# Patient Record
Sex: FEMALE | Race: WHITE | NOT HISPANIC OR LATINO | URBAN - NONMETROPOLITAN AREA
[De-identification: names, ages, dates, MRNs, and addresses within clinical notes are randomized per-mention and may not be internally consistent; named-entity substitution may affect disease eponyms.]

---

## 2023-08-07 ENCOUNTER — OFFICE VISIT (OUTPATIENT)
Dept: CARDIOLOGY | Facility: CLINIC | Age: 48
End: 2023-08-07
Payer: COMMERCIAL

## 2023-08-07 VITALS
DIASTOLIC BLOOD PRESSURE: 62 MMHG | WEIGHT: 245 LBS | OXYGEN SATURATION: 98 % | BODY MASS INDEX: 46.26 KG/M2 | HEIGHT: 61 IN | HEART RATE: 78 BPM | SYSTOLIC BLOOD PRESSURE: 120 MMHG

## 2023-08-07 DIAGNOSIS — G47.33 OSA (OBSTRUCTIVE SLEEP APNEA): Primary | ICD-10-CM

## 2023-08-07 PROCEDURE — 99213 OFFICE O/P EST LOW 20 MIN: CPT | Performed by: NURSE PRACTITIONER

## 2023-08-07 RX ORDER — ESCITALOPRAM OXALATE 10 MG/1
10 TABLET ORAL DAILY
COMMUNITY
Start: 2023-07-27

## 2023-08-07 RX ORDER — LEVOTHYROXINE SODIUM 0.1 MG/1
100 TABLET ORAL DAILY
COMMUNITY

## 2023-08-07 RX ORDER — FLUTICASONE PROPIONATE 50 MCG
1 SPRAY, SUSPENSION (ML) NASAL AS NEEDED
COMMUNITY
Start: 2023-07-31

## 2023-08-07 RX ORDER — MULTIPLE VITAMINS W/ MINERALS TAB 9MG-400MCG
1 TAB ORAL DAILY
COMMUNITY

## 2023-08-07 NOTE — ASSESSMENT & PLAN NOTE
Baseline AHI is mild.  She is on CPAP therapy.  Download is reviewed good control and good compliance.  She is benefiting from PAP therapy and plan to continue PAP therapy.    Prescription for CPAP supplies to the DME of her choice.    She is encouraged to increase her humidifier to prevent dryness.    Plan follow-up in 1 year or sooner for OMAR or PAP concerns.

## 2023-08-07 NOTE — PROGRESS NOTES
Follow-Up Sleep Consult     Date:   2023  Name: Vane Rey  :   1975  PCP: Orly Hung DO    Chief Complaint   Patient presents with    Sleep Apnea       Subjective     History of Present Illness  Vane Rey is a 47 y.o. female who presents today for follow-up on OMAR. She reports that she is under stress and she has gained weigh this year. She uses a mouth guard when she sleeps but she still snores with the mouthguard in not wearing PAP therapy.  She reports that when she wears her CPAP therapy her  reports she does not store.    She reports that she does not love her PAP therapy.  And she reports at times she just does not use PAP therapy.  She reports sometimes when she does not use her PAP therapy that she does feel more tired and her  reports she snores.    She reports she does not like the hot air and she is currently feeling dry.    Sleep history:    OMAR AHI 6 on 2016    Current OMAR therapy auto CPAP 12 to 15 cm      Current mask used is Nasal Mask cushion     Device Functioning Well: Yes - she has been on Replacement DS #2 since   Mask Fit Comfortable: Yes  Air Flow Comfortable: Yes  DME Helpful for Supplies: Yes  Sleep is rested: Yes        Device Download:           The patient's relevant past medical, surgical, family, and social history reviewed and updated in Epic as appropriate.    Past Medical History:   Diagnosis Date    Allergic     Anxiety     History of ear infections     OMAR (obstructive sleep apnea)     Sinusitis     Strep throat      Past Surgical History:   Procedure Laterality Date     SECTION      x 2    WISDOM TOOTH EXTRACTION       OB History    No obstetric history on file.       No Known Allergies  Prior to Admission medications    Medication Sig Start Date End Date Taking? Authorizing Provider   cetirizine (ZyrTEC) 10 MG tablet Take 1 tablet by mouth Daily.   Yes Provider, MD Dunia   escitalopram (LEXAPRO) 10 MG tablet  "Take 1 tablet by mouth Daily. 7/27/23  Yes Dunia Adames MD   fluticasone (FLONASE) 50 MCG/ACT nasal spray 1 spray into the nostril(s) as directed by provider As Needed. 7/31/23  Yes Dunia Adames MD   levothyroxine (SYNTHROID, LEVOTHROID) 100 MCG tablet Take 1 tablet by mouth Daily.   Yes Dunia Adames MD   multivitamin with minerals tablet tablet Take 1 tablet by mouth Daily.   Yes Dunia Adames MD   levothyroxine (SYNTHROID, LEVOTHROID) 112 MCG tablet Take 112 mcg by mouth Daily.  8/7/23  Dunia Adames MD   sertraline (ZOLOFT) 100 MG tablet Take 100 mg by mouth Daily.  8/7/23  Dunia Adames MD     Family History   Problem Relation Age of Onset    Diabetes Mother     Obesity Mother     Diabetes Father     Obesity Father        Objective     Vital Signs:  /62 (BP Location: Right arm, Patient Position: Sitting)   Pulse 78   Ht 154.9 cm (61\")   Wt 111 kg (245 lb)   SpO2 98%   BMI 46.29 kg/mý     Class 3 Severe Obesity (BMI >=40). Obesity-related health conditions include the following: obstructive sleep apnea. Obesity is worsening. BMI is is above average; BMI management plan is completed. We discussed low calorie, low carb based diet program, portion control, and increasing exercise.  She reports when she returns to work as a  that she will increase her daily steps to about 7000 steps per day.        Physical Exam  HENT:      Head: Normocephalic.      Nose: Nose normal.      Mouth/Throat:      Mouth: Mucous membranes are moist.   Pulmonary:      Effort: Pulmonary effort is normal.   Skin:     General: Skin is warm and dry.   Neurological:      Mental Status: She is alert and oriented to person, place, and time.   Psychiatric:         Mood and Affect: Mood normal.         Behavior: Behavior normal.         Thought Content: Thought content normal.           PAP download reviewed: 7/8 through 8/6/2023.  30-day download.  I have reviewed and " interpreted the data on the download.         Assessment and Plan     Diagnoses and all orders for this visit:    1. OMAR (obstructive sleep apnea) (Primary)  Assessment & Plan:  Baseline AHI is mild.  She is on CPAP therapy.  Download is reviewed good control and good compliance.  She is benefiting from PAP therapy and plan to continue PAP therapy.    Prescription for CPAP supplies to the DME of her choice.    She is encouraged to increase her humidifier to prevent dryness.    Plan follow-up in 1 year or sooner for OMAR or PAP concerns.    Orders:  -     PAP Therapy        Report if any new/changing symptoms immediately, Exercise recommendations discussed, Increase pap therapy usage, and dietary recommendations discussed such as decrease sugar intake, decrease carbohydrate intake, decrease calorie intake.         Follow Up  Return in about 1 year (around 8/7/2024) for OMAR.  Patient was given instructions and counseling regarding her condition or for health maintenance advice. Please see specific information pulled into the AVS if appropriate.

## 2024-01-02 ENCOUNTER — OFFICE VISIT (OUTPATIENT)
Dept: ENDOCRINOLOGY | Facility: CLINIC | Age: 49
End: 2024-01-02
Payer: COMMERCIAL

## 2024-01-02 VITALS
DIASTOLIC BLOOD PRESSURE: 62 MMHG | SYSTOLIC BLOOD PRESSURE: 124 MMHG | BODY MASS INDEX: 46.07 KG/M2 | WEIGHT: 244 LBS | OXYGEN SATURATION: 97 % | HEART RATE: 71 BPM | HEIGHT: 61 IN

## 2024-01-02 DIAGNOSIS — E06.3 HYPOTHYROIDISM DUE TO HASHIMOTO'S THYROIDITIS: Primary | ICD-10-CM

## 2024-01-02 DIAGNOSIS — E66.01 CLASS 3 SEVERE OBESITY DUE TO EXCESS CALORIES WITH SERIOUS COMORBIDITY AND BODY MASS INDEX (BMI) OF 45.0 TO 49.9 IN ADULT: ICD-10-CM

## 2024-01-02 DIAGNOSIS — R73.03 PREDIABETES: ICD-10-CM

## 2024-01-02 DIAGNOSIS — E03.8 HYPOTHYROIDISM DUE TO HASHIMOTO'S THYROIDITIS: Primary | ICD-10-CM

## 2024-01-02 LAB
ANION GAP SERPL CALCULATED.3IONS-SCNC: 11.5 MMOL/L (ref 5–15)
BUN SERPL-MCNC: 13 MG/DL (ref 6–20)
BUN/CREAT SERPL: 18.1 (ref 7–25)
CALCIUM SPEC-SCNC: 9.2 MG/DL (ref 8.6–10.5)
CHLORIDE SERPL-SCNC: 103 MMOL/L (ref 98–107)
CO2 SERPL-SCNC: 25.5 MMOL/L (ref 22–29)
CREAT SERPL-MCNC: 0.72 MG/DL (ref 0.57–1)
EGFRCR SERPLBLD CKD-EPI 2021: 103.9 ML/MIN/1.73
GLUCOSE SERPL-MCNC: 123 MG/DL (ref 65–99)
HBA1C MFR BLD: 6.4 % (ref 4.8–5.6)
POTASSIUM SERPL-SCNC: 4.4 MMOL/L (ref 3.5–5.2)
SODIUM SERPL-SCNC: 140 MMOL/L (ref 136–145)
T4 FREE SERPL-MCNC: 1.19 NG/DL (ref 0.93–1.7)
TSH SERPL DL<=0.05 MIU/L-ACNC: 2.49 UIU/ML (ref 0.27–4.2)

## 2024-01-02 PROCEDURE — 84443 ASSAY THYROID STIM HORMONE: CPT | Performed by: INTERNAL MEDICINE

## 2024-01-02 PROCEDURE — 80048 BASIC METABOLIC PNL TOTAL CA: CPT | Performed by: INTERNAL MEDICINE

## 2024-01-02 PROCEDURE — 84439 ASSAY OF FREE THYROXINE: CPT | Performed by: INTERNAL MEDICINE

## 2024-01-02 PROCEDURE — 99204 OFFICE O/P NEW MOD 45 MIN: CPT | Performed by: INTERNAL MEDICINE

## 2024-01-02 PROCEDURE — 83036 HEMOGLOBIN GLYCOSYLATED A1C: CPT | Performed by: INTERNAL MEDICINE

## 2024-01-02 NOTE — PROGRESS NOTES
Chief Complaint   Patient presents with    Thyroid Problem        Referring Provider  Orly Hung, *     HPI   Vane Rey is a 48 y.o. female had concerns including Thyroid Problem.     New patient referred for hypothyroidism.  She is on levothyroxine 100 mcg daily. Takes as directed, in AM, 1 hr before meds/food/coffee, no missed doses. TFTs from August were normal.  TPO was checked and was elevated.   Hypothyroidism was diagnosed about 20 years ago.   In the past was on 112 mcg daily. Since the dose reduction she has struggled with weight gain.   Pt recalls the dose was reduced in January when her TSH was 3.   Had lost 25 lbs and with dose reduction she regained all.     She gets 8000 steps per day. No other exercise.   Has OMAR. Compliant with CPAP.     She also has prediabetes with last A1c 6.0.    Diet:   Drinks water.   Avoids fried foods.   Manages her carb intake.   Cheats on sweets some (spoonful of nutella on occasion).  Breakfast: 1/4 oatmeal with spoonful of PB or half a sandwich (PB), coffee with 1/2 tsp of sweetener and 1 tsp sugar free vanilla sweetener  Lunch: turkey sandwich, leftovers. If has chips is portioned.   Dinner: chicken, fatjita style, peppers/onions, turkey burger, soups  Snacks: popcorn (white cheddar smart food), crackers    Past Medical History:   Diagnosis Date    Allergic     Anxiety     History of ear infections     Hypothyroidism     OMAR (obstructive sleep apnea)     Prediabetes     Sinusitis     Strep throat      Past Surgical History:   Procedure Laterality Date     SECTION      x 2    WISDOM TOOTH EXTRACTION        Family History   Problem Relation Age of Onset    Diabetes Mother     Obesity Mother     Diabetes Father     Obesity Father       Social History     Socioeconomic History    Marital status:    Tobacco Use    Smoking status: Never     Passive exposure: Never    Smokeless tobacco: Never   Vaping Use    Vaping Use: Never used   Substance and  "Sexual Activity    Alcohol use: Defer    Drug use: Defer    Sexual activity: Yes     Partners: Male     Birth control/protection: OCP      No Known Allergies   Current Outpatient Medications on File Prior to Visit   Medication Sig Dispense Refill    cetirizine (ZyrTEC) 10 MG tablet Take 1 tablet by mouth Daily.      escitalopram (LEXAPRO) 10 MG tablet Take 1 tablet by mouth Daily.      fluticasone (FLONASE) 50 MCG/ACT nasal spray 1 spray into the nostril(s) as directed by provider As Needed.      levothyroxine (SYNTHROID, LEVOTHROID) 100 MCG tablet Take 1 tablet by mouth Daily.      multivitamin with minerals tablet tablet Take 1 tablet by mouth Daily.       No current facility-administered medications on file prior to visit.        Review of Systems   Constitutional: Negative.  Positive for unexpected weight gain.   HENT: Negative.     Eyes: Negative.    Respiratory: Negative.     Cardiovascular: Negative.    Gastrointestinal: Negative.    Endocrine: Negative.    Genitourinary: Negative.    Musculoskeletal: Negative.    Allergic/Immunologic: Positive for environmental allergies.   Neurological: Negative.    Hematological: Negative.    Psychiatric/Behavioral: Negative.  The patient is nervous/anxious.         /62 (BP Location: Right arm, Patient Position: Sitting, Cuff Size: Adult)   Pulse 71   Ht 154.9 cm (61\")   Wt 111 kg (244 lb)   SpO2 97%   BMI 46.10 kg/m²      Physical Exam    Constitutional:  well developed; well nourished  no acute distress  obese - Body mass index is 46.1 kg/m².   ENT/Thyroid: no thyromegaly  no palpable nodules   Eyes: EOM intact  Conjunctiva: clear   Respiratory:  breathing is unlabored  clear to auscultation bilaterally   Cardiovascular:  regular rate and rhythm, S1, S2 normal, no murmur, click, rub or gallop   Chest:  Not performed.   Abdomen: Not performed.   : Not performed.   Musculoskeletal: negative findings:  ROM of all joints is normal, no deformities present   Skin: " dry and warm   Neuro: normal without focal findings and mental status, speech normal, alert and oriented x3   Psych: oriented to time, place and person, mood and affect are within normal limits     Labs/Imaging    8/11/2023 glucose 100, creatinine 0.61, , LFTs normal, A1c 6.0, TSH 2.23, free T4 1.42, TPO antibody 97  1/19/2023 TSH 3.09, free T4 1.25    Assessment and Plan    Diagnoses and all orders for this visit:    1. Hypothyroidism due to Hashimoto's thyroiditis (Primary)  Clinically euthyroid on levothyroxine 100 mcg daily, taking as directed.  Weight gain since dose was reduced from 112 mcg.  Felt better on higher dose.  Recheck TFTs today and titrate levothyroxine if able for TSH in the mid to lower range of normal.  Printed lab order given to recheck labs 6 weeks after dose adjustment.  -     T4, Free  -     TSH  -     T4, Free; Future  -     TSH; Future    2. Prediabetes  Last A1c 6.0.  Continue exercise.  Weight loss advised for overall health and management of diabetes.  Discussed tracking intake.  Increase exercise.  -     Hemoglobin A1c  -     Basic Metabolic Panel    3. Class 3 severe obesity due to excess calories with serious comorbidity and body mass index (BMI) of 45.0 to 49.9 in adult  BMI 46.1.  Weight loss advised. Track calorie intake. Decrease by 500 glenroy/day with a goal for weight loss. Increase exercise as able.  Thyroid management as above.       Return in about 6 months (around 7/2/2024) for next scheduled follow up. The patient was instructed to contact the clinic with any interval questions or concerns.    Lia Robbins, DO   Endocrinologist    Please note that portions of this note were completed with a voice recognition program.

## 2024-01-03 DIAGNOSIS — E06.3 HYPOTHYROIDISM DUE TO HASHIMOTO'S THYROIDITIS: Primary | ICD-10-CM

## 2024-01-03 DIAGNOSIS — E03.8 HYPOTHYROIDISM DUE TO HASHIMOTO'S THYROIDITIS: Primary | ICD-10-CM

## 2024-01-03 RX ORDER — LEVOTHYROXINE SODIUM 112 UG/1
112 TABLET ORAL DAILY
Qty: 30 TABLET | Refills: 5 | Status: SHIPPED | OUTPATIENT
Start: 2024-01-03

## 2024-06-25 LAB — TSH SERPL DL<=0.005 MIU/L-ACNC: 0.93 UIU/ML (ref 0.45–4.5)

## 2024-07-03 ENCOUNTER — OFFICE VISIT (OUTPATIENT)
Dept: ENDOCRINOLOGY | Facility: CLINIC | Age: 49
End: 2024-07-03
Payer: COMMERCIAL

## 2024-07-03 VITALS
BODY MASS INDEX: 45.5 KG/M2 | WEIGHT: 241 LBS | SYSTOLIC BLOOD PRESSURE: 120 MMHG | HEART RATE: 65 BPM | HEIGHT: 61 IN | OXYGEN SATURATION: 97 % | DIASTOLIC BLOOD PRESSURE: 68 MMHG

## 2024-07-03 DIAGNOSIS — E03.8 HYPOTHYROIDISM DUE TO HASHIMOTO'S THYROIDITIS: Primary | ICD-10-CM

## 2024-07-03 DIAGNOSIS — R73.03 PREDIABETES: ICD-10-CM

## 2024-07-03 DIAGNOSIS — E66.01 CLASS 3 SEVERE OBESITY DUE TO EXCESS CALORIES WITH SERIOUS COMORBIDITY AND BODY MASS INDEX (BMI) OF 45.0 TO 49.9 IN ADULT: ICD-10-CM

## 2024-07-03 DIAGNOSIS — E06.3 HYPOTHYROIDISM DUE TO HASHIMOTO'S THYROIDITIS: Primary | ICD-10-CM

## 2024-07-03 PROCEDURE — 99214 OFFICE O/P EST MOD 30 MIN: CPT | Performed by: INTERNAL MEDICINE

## 2024-07-03 RX ORDER — LEVOTHYROXINE SODIUM 112 UG/1
112 TABLET ORAL DAILY
Qty: 90 TABLET | Refills: 3 | Status: SHIPPED | OUTPATIENT
Start: 2024-07-03

## 2024-07-03 NOTE — PROGRESS NOTES
"Chief Complaint   Patient presents with    Hypothyroidism    Prediabetes        HPI   Vane Rey is a 48 y.o. female had concerns including Hypothyroidism and Prediabetes.      Recent TSH is in an optimal range at 0.93.  Was last 2.49 and dose was increased from 100 to 112 mcg daily.  She feels better. Energy is improved.     Her weight is down 3 pounds since January. She struggles with weight management. Her mother ended up getting weight loss surgery.     The following portions of the patient's history were reviewed and updated as appropriate: allergies, current medications, past family history, past medical history, past social history, past surgical history, and problem list.    Review of Systems   Constitutional: Negative.    Endocrine: Negative.         /68   Pulse 65   Ht 154.9 cm (61\")   Wt 109 kg (241 lb)   SpO2 97%   BMI 45.54 kg/m²      Physical Exam  Vitals reviewed.   Constitutional:       Appearance: Normal appearance. She is obese.      Comments: Body mass index is 45.54 kg/m².   Cardiovascular:      Rate and Rhythm: Normal rate.   Pulmonary:      Effort: Pulmonary effort is normal.   Neurological:      General: No focal deficit present.      Mental Status: She is alert. Mental status is at baseline.   Psychiatric:         Mood and Affect: Mood normal.         Behavior: Behavior normal.              LABS AND IMAGING   CMP  Lab Results   Component Value Date    GLUCOSE 123 (H) 01/02/2024    BUN 13 01/02/2024    CREATININE 0.72 01/02/2024    BCR 18.1 01/02/2024    K 4.4 01/02/2024    CO2 25.5 01/02/2024    CALCIUM 9.2 01/02/2024      TSH  Lab Results   Component Value Date    TSH 0.930 06/24/2024    TSH 2.490 01/02/2024     T4  Lab Results   Component Value Date    FREET4 1.19 01/02/2024     Hemoglobin A1C   Date Value Ref Range Status   01/02/2024 6.40 (H) 4.80 - 5.60 % Final         Assessment and Plan    Diagnoses and all orders for this visit:    1. Hypothyroidism due to Hashimoto's " thyroiditis (Primary)  Controlled with TSH improved to 0.93 and symptoms are improved. Continue levothyroxine 112 mcg daily.   Monitor TFTs yearly.  Refill sent.    2. Prediabetes  Last A1c was 6.4. She has lost weight. Check again with PCP at next follow-up.     3. Class 3 severe obesity due to excess calories with serious comorbidity and body mass index (BMI) of 45.0 to 49.9 in adult  BMI down to 45.6. Congratulated on 3 lbs weight loss.   Increase exercise as able. Walking even for improved insulin resistance.   Decrease caloric intake and maintain consistency in the diet.        Return in about 1 year (around 7/3/2025) for next scheduled follow up. The patient was instructed to contact the clinic with any interval questions or concerns.    Electronically signed by: Lia Robbins DO   Endocrinologist    Please note that portions of this note were completed with a voice recognition program.